# Patient Record
Sex: FEMALE | Race: WHITE
[De-identification: names, ages, dates, MRNs, and addresses within clinical notes are randomized per-mention and may not be internally consistent; named-entity substitution may affect disease eponyms.]

---

## 2023-07-31 ENCOUNTER — HOSPITAL ENCOUNTER (EMERGENCY)
Dept: HOSPITAL 26 - MED | Age: 48
Discharge: HOME | End: 2023-07-31
Payer: MEDICAID

## 2023-07-31 VITALS
HEART RATE: 69 BPM | TEMPERATURE: 97.8 F | OXYGEN SATURATION: 99 % | SYSTOLIC BLOOD PRESSURE: 107 MMHG | DIASTOLIC BLOOD PRESSURE: 74 MMHG | RESPIRATION RATE: 20 BRPM

## 2023-07-31 VITALS — HEIGHT: 64 IN | BODY MASS INDEX: 21.68 KG/M2 | WEIGHT: 127 LBS

## 2023-07-31 VITALS
TEMPERATURE: 97.8 F | HEART RATE: 72 BPM | SYSTOLIC BLOOD PRESSURE: 117 MMHG | OXYGEN SATURATION: 99 % | DIASTOLIC BLOOD PRESSURE: 71 MMHG | RESPIRATION RATE: 16 BRPM

## 2023-07-31 DIAGNOSIS — F11.23: ICD-10-CM

## 2023-07-31 DIAGNOSIS — F43.10: ICD-10-CM

## 2023-07-31 DIAGNOSIS — Z79.899: ICD-10-CM

## 2023-07-31 DIAGNOSIS — G47.00: Primary | ICD-10-CM

## 2023-07-31 NOTE — NUR
Patient discharged. Written and verbal after care instructions given and 
explained. 

Patient alert, oriented and verbalized understanding of instructions. 
Ambulatory with steady gait. All questions addressed prior to discharge. ID 
band removed. Patient advised to follow up with PMD. Rx of Melatonin given. 
Patient educated on indication of medication including possible reaction and 
side effects. Opportunity to ask questions provided and answered. [employer] Ride 
provided.

## 2023-07-31 NOTE — NUR
Pt BIB self with c/o unable to sleep. She claims to have tretment in few days. 
Concious. AAOx4. Denies any PMHx and allergies. 

-------------------------------------------------------------------------------

Addendum: 07/31/23 at 0501 by [email or web address]

-------------------------------------------------------------------------------

Pt Allergic to anticholinergic meds.